# Patient Record
(demographics unavailable — no encounter records)

---

## 2025-04-16 NOTE — HEALTH RISK ASSESSMENT
[No] : No [No falls in past year] : Patient reported no falls in the past year [Never] : Never [Audit-CScore] : 0

## 2025-04-16 NOTE — ASSESSMENT
[FreeTextEntry1] : weight gain The BMI for IRINEO was assessed. The BMI was found to be high 31- intervention performed was: lifestyle education regarding diet. Being overweight can negatively impact your health  by increasing risk of high blood pressure, heart disease, stroke, arthritis, or joint pain and diabetes. It is important to exercise 150 minutes per week as well as maintain a calorie appropriate diet that is rich in fruits and vegetables, and low in excess salt and fat. snoring rx adult home sleep study call ins what is covered for weight loss  (call insurance and see the criteria  ( BMI) for medications  and what medications wegovy or zepbound) Labs drawn/ specimens obtained  in office on this date of service  for evaluation of   assessed conditions -  cmp lipid tft    to be run at [facility]. 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  diet reviewed he is eating too much red mean and carbs and not getting enough exercise.  mood do dc lexapro wean to 10 mg for 1 week then change to cymbalta 30 mg for 1 week then 2 daily and follow up allergies optivar 1 gtt ou bid  ipratropium bromide 2 sp q nos qd

## 2025-04-16 NOTE — HISTORY OF PRESENT ILLNESS
[FreeTextEntry1] : patient presents for   follow up on weight loss medication  [de-identified] : 43 yo wm hx of obesity . he was on mounjaro since 6/2023 previous aic  as high as 6.0  in 2023 . He is here for follow up on medication  He got hurt in his right shoulder  and had surgery  to repair his torn bicep May 2024 Dr marianne Castellanos  he did PT  He feels better  He is retiring in July . He is using his time and running it out.  He is working on the house.  He gained the weight back. vinicius since he had gotten injured.  He has not been on the mounjaro for 1 year  i have a hx of nico years ago. i can t fall asleep or stay asleep. I snore   i sweat at night when i was overweight only the lower half  of my body from by abdomen down to my feet.  It was gone when i was less weight.  lexapro helps but it also makes me "low"  i get quiet on it. my wife says i get quiet.  i dont like that effect  diet b- caldwell egg cheese  bagel  water l- skip d-  4 oz steak green bean risotto

## 2025-04-16 NOTE — PHYSICAL EXAM
[Conjunctival Hyperemia Bilateral] : hyperemia [Conjunctival Watery Discharge Both Eyes] : a watery discharge [Normal] : normal rate, regular rhythm, normal S1 and S2 and no murmur heard

## 2025-04-16 NOTE — REVIEW OF SYSTEMS
[Recent Change In Weight] : ~T recent weight change [Discharge] : discharge [Redness] : redness [Vision Problems] : vision problems [Itching] : itching [Negative] : Respiratory

## 2025-05-28 NOTE — ASSESSMENT
[FreeTextEntry1] : weight gain The BMI for IRINEO was assessed. The BMI was found to be high 31- intervention performed was: lifestyle education regarding diet. Being overweight can negatively impact your health  by increasing risk of high blood pressure, heart disease, stroke, arthritis, or joint pain and diabetes. It is important to exercise 150 minutes per week as well as maintain a calorie appropriate diet that is rich in fruits and vegetables, and low in excess salt and fat. snoring/ sleep study performed  dx moderate nico   cpap titration to be arranged. weight loss suggested    rx zepbound 2.5 mg q week

## 2025-05-28 NOTE — HISTORY OF PRESENT ILLNESS
[FreeTextEntry1] : patient presents for   follow up on weight loss medication  [de-identified] : 45 yo wm hx of obesity . nico he had sleep study and is interested in weight loss medication wife  called insurance co- zepbound need pa    4/16/25 he was on mounjaro since 6/2023 previous aic  as high as 6.0  in 2023 . He is here for follow up on medication  He got hurt in his right shoulder  and had surgery  to repair his torn bicep May 2024 Dr marianne Castellanos  he did PT  He feels better  He is retiring in July . He is using his time and running it out.  He is working on the house.  He gained the weight back. vinicius since he had gotten injured.  He has not been on the mounjaro for 1 year  i have a hx of nico years ago. i can t fall asleep or stay asleep. I snore   i sweat at night when i was overweight only the lower half  of my body from by abdomen down to my feet.  It was gone when i was less weight.  lexapro helps but it also makes me "low"  i get quiet on it. my wife says i get quiet.  i dont like that effect  diet b- caldwell egg cheese  bagel  water l- skip d-  4 oz steak green bean risotto